# Patient Record
Sex: MALE | Race: OTHER
[De-identification: names, ages, dates, MRNs, and addresses within clinical notes are randomized per-mention and may not be internally consistent; named-entity substitution may affect disease eponyms.]

---

## 2017-11-27 ENCOUNTER — HOSPITAL ENCOUNTER (OUTPATIENT)
Dept: HOSPITAL 53 - M PAIN | Age: 27
End: 2017-11-27
Attending: ANESTHESIOLOGY
Payer: COMMERCIAL

## 2017-11-27 DIAGNOSIS — Z79.899: ICD-10-CM

## 2017-11-27 DIAGNOSIS — F17.210: ICD-10-CM

## 2017-11-27 DIAGNOSIS — G89.29: Primary | ICD-10-CM

## 2017-11-27 DIAGNOSIS — M79.1: ICD-10-CM

## 2017-11-27 PROCEDURE — 20552 NJX 1/MLT TRIGGER POINT 1/2: CPT

## 2017-12-13 NOTE — ECWPNPC
PATIENT NAME: ADELINE VELAZQUEZ

: 1990

GENDER: MALE

MRN: [id number]

VISIT DATE: 2017

DISCHARGE DATE: 17 1508

VISIT LOCKED DATE TIME: 

PHYSICIAN: CHRIS QUINTERO  

RESOURCE: CHRIS QUINTERO  

 

           

           

HISTORY OF PRESENT ILLNESS

           

      HISTORY OF PRESENT ILLNESS:

      PAIN

           

           

          THE PATIENT DESCRIBES THE PAIN...

           

      FALL RISK SCREENING:

      SCREENING

           

           

          :NO FALLS IN THE PAST YEAR

           

CURRENT MEDICATIONS

           

          TAKING GABAPENTIN 100 MG CAPSULE 1 CAP ORALLY BID, NOTES:

          17 2100

          TAKING SOMA 350 MG TABLET 1 TABLET AS NEEDED ORALLY Q8H PRN MDD2,

          NOTES: NONE RECENT

          MEDICATION LIST REVIEWED AND RECONCILED WITH THE PATIENT

           

PAST MEDICAL HISTORY

           

          MI

          ASTHMA AS A CHILD

           

ALLERGIES

           

          N.K.D.A.

           

SOCIAL HISTORY

           

          GENERAL:

           

          TOBACCO USE

          ARE YOU A:CURRENT SMOKER

          ARE YOU INTERESTED IN QUITTING?THINKING ABOUT QUITTING

          PREVIOUS QUIT ATTEMPTS?YES, WITHIN THE LAST 6 MONTHS.

          COUNSELED THE PATIENT ON SMOKING CESSATION, EDUCATION

          [id number]/02/2017

          HOW MANY CIGARETTES A DAY DO YOU SMOKE?5 OR LESS

          HOW SOON AFTER YOU WAKE UP DO YOU SMOKE YOUR FIRST

          CIGARETTE?AFTER 60 MIN

          HOW OFTEN DO YOU SMOKE CIGARETTES?EVERY DAY

          PATIENT COUNSELED ON THE DANGERS OF TOBACCO USE AND URGED TO

          QUIT:2017

           

           

          LUNG CANCER SCREENING

          SMOKING STATUS:CURRENT SMOKER

           

           

          ALCOHOL SCREENING

          DID YOU HAVE A DRINK CONTAINING ALCOHOL IN THE PAST YEAR?YES

          HOW OFTEN DID YOU HAVE A DRINK CONTAINING ALCOHOL IN THE PAST

          YEAR?TWO TO FOUR TIMES A MONTH (2 POINTS)

          HOW MANY DRINKS DID YOU HAVE ON A TYPICAL DAY WHEN YOU WERE

          DRINKING IN THE PAST YEAR?3 OR 4 (1 POINT)

          POINTS3

          INTERPRETATIONNEGATIVE

           

           

          OCCUPATION: .

           

           

          MARITAL STATUS: .

           

           

          P[employer]ism

          [id number] [employer]

           

           

          LANGUAGE

          LANGUAGES SPOKEN:ENGLISH

           

           

          EDUCATION

          LEVEL OF EDUCATION:NOT FINISHED COLLEGE

           

           

          LEARNING BARRIERS / SPECIAL NEEDS

          HEARING IMPAIRED?NO

          VISION IMPAIRED?NO

          COGNITIVELY IMPAIRED?NO

          READINESS TO LEARN?YES

          LEARNING PREFERENCES?YES

          :BOOKLETS, HANDOUTS

          SPECIAL DEVICES?NO

           

           

          PAIN CLINIC PFS, CLERGY, PUBLIC HEALTH REFERRALS

          HAS THE PATIENT BEEN EDUCATED REGARDING HIS/HER PLAN OF CARE?YES

          REVIEWED TPI PROCEDURE WITH PATIENT

          HAS THE PATIENT BEEN EDUCATED REGARDING PAIN, THE RISK FOR PAIN,

          THE IMPORTANCE OF EFFECTIVE PAIN MANAGEMENT, AND THE PAIN

          ASSESSMENT PROCESS?YES

           

           

          ADVANCE DIRECTIVES

          HEALTH CARE PROXY?NO

          DO YOU HAVE A DNR?NO

          LIVING WILL?NO

          POWER OF ?NO

           

REVIEW OF SYSTEMS

           

      REVIEWED BY:

           

          PROVIDER:    _____ .

           

      CONSTITUTIONAL:

           

          ANY CHANGE IN YOUR MEDICAL CONDITION?    NO . CHILLS    NO .

          FEVER    NO .

           

      INFECTION:

           

          DO YOU HAVE NEW INFECTIONS?    NO . DO YOU HAVE HISTORY OF MRSA? 

            NO .

           

      MUSCULOSKELETAL:

           

          ANY NEW PATTERNS OF PAIN OR NUMBNESS?    NO .

           

      GASTROENTEROLOGY:

           

          ANY NEW CHANGE IN BOWEL CONTROL?    NO .

           

      GENITOURINARY:

           

          ANY NEW CHANGE IN BLADDER CONTROL?    NO . IS THERE A CHANCE YOU

          COULD BE PREGNANT?    NO .

           

      HEMATOLOGY/LYMPH:

           

          DO YOU TAKE ANY BLOOD THINNERS? (FOR EXAMPLE- COUMADIN, PLAVIX,

          AGGRENOX, PLATEL, PRADAXA, OR XARELTO)    NO . WHEN WAS YOUR LAST

          DOSE?    DATE: TIME:  .

           

      NEUROLOGY:

           

          HAVE YOU FALLEN IN THE PAST 6 MONTHS?    NO . ANY NEW EXTREMITY

          NUMBNESS OR WEAKNESS?    NO .

           

      CARDIOLOGY:

           

          DO YOU HAVE A PACEMAKER OR DEFIBRILLATOR?    NO .

           

      RESPIRATORY:

           

          HAVE YOU BEEN SICK IN THE PAST WEEK?    NO . FEVER    NO . FLU

          LIKE SYMPTOMS?    NO . COUGH    NO .

           

      INTEGUMENTARY:

           

          DO YOU HAVE ANY RASHES OR OPEN SORES?    NO .

           

      ALLERGIC/IMMUNO:

           

          ARE YOU ALLERGIC TO SHELLFISH OR IV DYE?    NO . ANY NEW

          ALLERGIES?    NO .

           

      PSYCHIATRIC:

           

          DO YOU HAVE THOUGHTS OF HURTING YOURSELF OR SOMEONE ELSE?    NO .

          ARE YOU ABUSED, NEGLECTED, OR IN AN UNSAFE ENVIRONMENT?    NO .

           

      ENDOCRINOLOGY:

           

          ARE YOU DIABETIC?    NO .

           

      OTHER:

           

          DO YOU NEED ANY PRESCRIPTIONS?    NO . IF YES, PLEASE LIST:   

          ____ . ANY NEW PROBLEMS WITH YOUR MEDICATIONS?    NO . WHEN DID

          YOU LAST EAT?    17 . WHEN DID YOU LAST DRINK?   

          17 . WHAT DID YOU LAST DRINK?    WATER . NAME OF

          PERSON DRIVING YOU HOME?    PETEY VELAZQUEZ . DO YOU HAVE ANY OTHER

          QUESTIONS OR CONCERNS    NO .

           

VITAL SIGNS

           

          .0 LBS, HT 67 IN, BMI 19.42 INDEX, /90 MM HG, HR 73

          /MIN, RR 16 /MIN, TEMP 97.5 F, OXYGEN SAT % 100%, NA INITIALS TL

          1321, REVIEWED BY: CM.

           

ASSESSMENTS

           

          MYALGIA - M79.1 (PRIMARY)

           

PROCEDURES

           

      PN TRIGGER POINT INJECTION WITH STEROIDS

          PRE PROCEDURE DIAGNOSIS    1. MYALGIA 2. PAIN AT BILATERAL LOWER

          BACK AREA

          POST PROCEDURE DIAGNOSIS    1. MYALGIA 2. PAIN AT BILATERAL LOWER

          BACK AREA

          PROCEDURE    TRIGGER POINT INJECTION AT BILATERAL LOWER BACK AREA

          SURGEON    DR. CHRIS QUINTERO

          ASSISTANT    NONE

          ANESTHESIA    LOCAL

          PRE PROCEDURE NOTE    THE PATIENT HAS A HISTORY OF CHRONIC PAIN

          AT THE RIGHT AND LEFT LOWER BACK AREA. I EVALUATE THE PATIENT AND

          REVIEWED THE CHART. THERE IS EVIDENCE OF BANDS OF TISSUE WITH

          RESTRICTION OF MOVEMENT AND PRESENCE OF TRIGGER POINT AT THE

          AFFECTED AREA. I WENT OVER THE RISKS, ALTERNATIVES, AND BENEFITS

          ASSOCIATED WITH THIS PROCEDURE. THE PATIENT WOULD LIKE TO PROCEED

          AND GIVE CONSENT TO PERFORMED THE PROCEDURE. THE PATIENT DENIES

          UNEXPLAINABLE WEIGHT LOSS, FEVER, CHILLS, OR NEW CHANGES IN

          URINARY OR BOWEL CONTROL

          DESCRIPTION OF PROCEDURE    THE PATIENT WAS BROUGHT TO THE

          PROCEDURE ROOM AND PLACED IN THE SITTING POSITION. THE AREA WAS

          CLEANED WITH ALCOHOL. THE PROCEDURE WAS DONE USING ASEPTIC

          STERILE TECHNIQUE. I CHECKED LATERALITY AND THE LEVEL WHERE THE

          PROCEDURE WAS GOING TO BE PERFORMED WITH THE PATIENT AND THE

          SUPPORTING STAFF AT THE MOMENT OF THE TIME OUT IN THE PROCEDURE

          ROOM. USING A 25-GAUGE NEEDLE, TRIGGER POINTS WERE INJECTED AT

          THE RIGHT AND LEFT LOWER BACK AREA WITH A TOTAL OF 40 ML OF

          BUPIVACAINE 0.25% AND KENALOG 40 MG. THERE WAS NO EVIDENCE OF

          BLOOD, PARESTHESIA OR CEREBROSPINAL FLUID DURING THE PROCEDURE.

          THE PATIENT WAS SENT TO THE RECOVERY ROOM. THE PATIENT WAS MOVING

          THE EXTREMITIES AND DOING WELL. THERE WAS NO COMPLICATION DURING

          THE PROCEDURE

          POST PROCEDURE NOTE    THE PATIENT WILL BE SEEN IN A FOLLOW UP IN

          THE NEXT FEW WEEKS. INSTRUCTIONS WERE GIVEN, QUESTIONS WERE

          ANSWERED, AND THE PATIENT EXPRESSED UNDERSTANDING AND AGREES WITH

          THE PLAN. I, HANNA TORRES, DOCUMENTED THE ABOVE INFORMATION

          ACTING AS A SCRIBE FOR DR. QUINTERO. I HAVE REVIEWED THE ABOVE

          DOCUMENT, WRITTEN BY HANNA DYER AND I VERIFY THAT IT

          IS ACCURATE

           

PROCEDURE CODES

           

          [id number] INJ TRIGGER POINT  [facility] [place]

           

DISPOSITION & COMMUNICATION

           

FOLLOW UP

           

          3 WEEKS

           

 

ELECTRONICALLY SIGNED BY CHRIS QUINTERO MD ON

          2017 AT 10:14 PM EST

           

           

           

 

DISCLAIMER :

THIS IS A VISIT SUMMARY EXTRACTED FROM THE [employer] CHART.

IT IS NOT A COPY OF THE [employer] PROGRESS NOTE.

TAWNY

## 2021-03-22 ENCOUNTER — HOSPITAL ENCOUNTER (OUTPATIENT)
Dept: HOSPITAL 53 - M LABSMTC | Age: 31
End: 2021-03-22
Attending: PEDIATRICS
Payer: SELF-PAY

## 2021-03-22 DIAGNOSIS — Z20.822: Primary | ICD-10-CM

## 2021-04-01 ENCOUNTER — HOSPITAL ENCOUNTER (OUTPATIENT)
Dept: HOSPITAL 53 - M LABSMTC | Age: 31
End: 2021-04-01
Attending: PEDIATRICS
Payer: SELF-PAY

## 2021-04-01 DIAGNOSIS — Z20.822: Primary | ICD-10-CM
